# Patient Record
Sex: FEMALE | Race: OTHER
[De-identification: names, ages, dates, MRNs, and addresses within clinical notes are randomized per-mention and may not be internally consistent; named-entity substitution may affect disease eponyms.]

---

## 2019-02-27 ENCOUNTER — FORM ENCOUNTER (OUTPATIENT)
Age: 69
End: 2019-02-27

## 2019-03-07 ENCOUNTER — FORM ENCOUNTER (OUTPATIENT)
Age: 69
End: 2019-03-07

## 2019-03-20 ENCOUNTER — FORM ENCOUNTER (OUTPATIENT)
Age: 69
End: 2019-03-20

## 2021-11-11 ENCOUNTER — NON-APPOINTMENT (OUTPATIENT)
Age: 71
End: 2021-11-11

## 2021-11-18 ENCOUNTER — APPOINTMENT (OUTPATIENT)
Dept: BREAST CENTER | Facility: CLINIC | Age: 71
End: 2021-11-18

## 2021-11-18 ENCOUNTER — APPOINTMENT (OUTPATIENT)
Dept: BREAST CENTER | Facility: CLINIC | Age: 71
End: 2021-11-18
Payer: MEDICARE

## 2021-11-18 VITALS
BODY MASS INDEX: 32.72 KG/M2 | HEIGHT: 64.57 IN | WEIGHT: 194 LBS | HEART RATE: 81 BPM | OXYGEN SATURATION: 97 % | SYSTOLIC BLOOD PRESSURE: 137 MMHG | DIASTOLIC BLOOD PRESSURE: 84 MMHG

## 2021-11-18 DIAGNOSIS — R92.2 INCONCLUSIVE MAMMOGRAM: ICD-10-CM

## 2021-11-18 DIAGNOSIS — F17.200 NICOTINE DEPENDENCE, UNSPECIFIED, UNCOMPLICATED: ICD-10-CM

## 2021-11-18 DIAGNOSIS — Z78.9 OTHER SPECIFIED HEALTH STATUS: ICD-10-CM

## 2021-11-18 DIAGNOSIS — Z87.891 PERSONAL HISTORY OF NICOTINE DEPENDENCE: ICD-10-CM

## 2021-11-18 DIAGNOSIS — Z85.3 PERSONAL HISTORY OF MALIGNANT NEOPLASM OF BREAST: ICD-10-CM

## 2021-11-18 DIAGNOSIS — Z12.39 ENCOUNTER FOR OTHER SCREENING FOR MALIGNANT NEOPLASM OF BREAST: ICD-10-CM

## 2021-11-18 PROCEDURE — 99213 OFFICE O/P EST LOW 20 MIN: CPT

## 2021-11-18 NOTE — PHYSICAL EXAM
[Normocephalic] : normocephalic [Supple] : supple [No Supraclavicular Adenopathy] : no supraclavicular adenopathy [Examined in the supine and seated position] : examined in the supine and seated position [Symmetrical] : symmetrical [No dominant masses] : no dominant masses in right breast  [No dominant masses] : no dominant masses left breast [No Nipple Retraction] : no left nipple retraction [No Nipple Discharge] : no left nipple discharge [No Axillary Lymphadenopathy] : no left axillary lymphadenopathy

## 2021-11-25 NOTE — REASON FOR VISIT
[Follow-Up: _____] : a [unfilled] follow-up visit [[employer] ] : provided by [employer]   [Interpreters_IDNumber] : [id number] [Interpreters_FullName] : Chantell [TWNoteComboBox1] : [place]

## 2021-11-25 NOTE — ASSESSMENT
[FreeTextEntry1] : 71 year old female previously under the care of Dr. 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 Pt does not recall ever seeing Med Onc. Pt also has hx of core biopsy in 2019 which was benign.\par \par  services used throughout visit as noted.\par \par Denies any palpable abnormalities, any skin changes/dimpling, or any nipple discharge bilaterally. \par \par Denies family history of breast or ovarian cancer. \par \par Denies ever having genetic testing.\par \par Reviewed and discussed breast imaging results. Advised pt to f/up with BL screening mammo and breast US and f/up visit for re-examination in 1 year. Patient verbalized understanding and agrees with plan.\par \par

## 2021-11-25 NOTE — DATA REVIEWED
[FreeTextEntry1] : 2/28/2019 (R) bilateral screening breasts are heterogeneously dense, 1.2 cm partially circumscribed mass in the subareolar region of the right breast. There are stable morphologically benign-appearing calcifications in both breasts. There are postlumpectomy surgical changes and post radiation therapy changes in the left breast. A scar marker is in place. A tissue marker is in place in each breast. In the retroareolar region right breast there is a heterogeneous predominantly hypoechoic mass with an irregular margin. This mass measures 1.3 x 1.1 x 1.0 cm. This mass is suspicious. US guided core biopsy recommended. Suspicious 1.3 cm mass subareolar region right breast. BIRADS 4 \par \par 3/8/2019 ([facility]) right retroareolar breast US guided biopsy pathology: Duct dilatation with mild periductal inflammatory infiltrate and dense stromal fibrosis. Recent hemorrhage/blood clot. Benign concordant, 6 month follow up US recommended. \par \par 11/18/2021 ([facility]) bilateral dx mammogram/US showing breasts are heterogeneously dense, Distortion in the central inner posterior left breast, mild skin and trabecular thickening remain stable. Benign posttreatment changes. No suspicious calcifications, dominant mass or new distortion is present. Oval mass in the right retroareolar breast with a tissue marker clip remains unchanged measuring 1.4 cm. Benign. Posttreatment changes left breast without suspicious features. Stable. Benign. No mammographic or ultrasonographic evidence of malignancy. No significant change. Benign BIRADS 2

## 2021-11-25 NOTE — PAST MEDICAL HISTORY
[Postmenopausal] : The patient is postmenopausal [Menarche Age ____] : age at menarche was [unfilled] [Menopause Age____] : age at menopause was [unfilled] [Total Preg ___] : G[unfilled] [Living ___] : Living: [unfilled] [History of Hormone Replacement Treatment] : has no history of hormone replacement treatment [FreeTextEntry5] :  [FreeTextEntry6] : No [FreeTextEntry7] : No [FreeTextEntry8] : yes

## 2021-11-25 NOTE — HISTORY OF PRESENT ILLNESS
[FreeTextEntry1] : 71 year old female previously under the care of Dr. 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 Pt does not recall ever seeing Med Onc. Pt also has hx of core biopsy in 2019 which was benign.\par \par  services used throughout visit as noted.\par \par Denies any palpable abnormalities, any skin changes/dimpling, or any nipple discharge bilaterally. \par \par Denies family history of breast or ovarian cancer. \par \par Denies ever having genetic testing.\par \par

## 2022-12-01 ENCOUNTER — NON-APPOINTMENT (OUTPATIENT)
Age: 72
End: 2022-12-01

## 2022-12-15 ENCOUNTER — NON-APPOINTMENT (OUTPATIENT)
Age: 72
End: 2022-12-15

## 2022-12-27 ENCOUNTER — APPOINTMENT (OUTPATIENT)
Dept: BREAST CENTER | Facility: CLINIC | Age: 72
End: 2022-12-27

## 2022-12-27 VITALS
HEART RATE: 111 BPM | DIASTOLIC BLOOD PRESSURE: 90 MMHG | BODY MASS INDEX: 32.04 KG/M2 | SYSTOLIC BLOOD PRESSURE: 156 MMHG | WEIGHT: 190 LBS | HEIGHT: 64.57 IN

## 2022-12-27 DIAGNOSIS — R92.8 OTHER ABNORMAL AND INCONCLUSIVE FINDINGS ON DIAGNOSTIC IMAGING OF BREAST: ICD-10-CM

## 2022-12-27 PROCEDURE — 99213 OFFICE O/P EST LOW 20 MIN: CPT

## 2022-12-27 RX ORDER — CELECOXIB 50 MG/1
CAPSULE ORAL
Refills: 0 | Status: ACTIVE | COMMUNITY

## 2022-12-27 RX ORDER — CHLORHEXIDINE GLUCONATE, 0.12% ORAL RINSE 1.2 MG/ML
0.12 SOLUTION DENTAL
Qty: 473 | Refills: 0 | Status: ACTIVE | COMMUNITY
Start: 2022-10-03

## 2022-12-27 RX ORDER — SILVER SULFADIAZINE 10 G/1000G
1 CREAM TOPICAL
Qty: 400 | Refills: 0 | Status: ACTIVE | COMMUNITY
Start: 2022-12-15

## 2022-12-27 RX ORDER — PRAVASTATIN SODIUM 10 MG/1
10 TABLET ORAL
Qty: 30 | Refills: 0 | Status: ACTIVE | COMMUNITY
Start: 2022-11-07

## 2022-12-27 RX ORDER — IBUPROFEN 600 MG/1
600 TABLET, FILM COATED ORAL
Qty: 20 | Refills: 0 | Status: ACTIVE | COMMUNITY
Start: 2022-09-27

## 2022-12-27 NOTE — DATA REVIEWED
[FreeTextEntry1] : 2/28/2019 ([facility]) bilateral screening breasts are heterogeneously dense, 1.2 cm partially circumscribed mass in the subareolar region of the right breast. There are stable morphologically benign-appearing calcifications in both breasts. There are postlumpectomy surgical changes and post radiation therapy changes in the left breast. A scar marker is in place. A tissue marker is in place in each breast. In the retroareolar region right breast there is a heterogeneous predominantly hypoechoic mass with an irregular margin. This mass measures 1.3 x 1.1 x 1.0 cm. This mass is suspicious. US guided core biopsy recommended. Suspicious 1.3 cm mass subareolar region right breast. BIRADS 4 \par \par 3/8/2019 ([facility]) right retroareolar breast US guided biopsy pathology: Duct dilatation with mild periductal inflammatory infiltrate and dense stromal fibrosis. Recent hemorrhage/blood clot. Benign concordant, 6 month follow up US recommended. \par \par 11/18/2021 ([facility]) bilateral dx mammogram/US showing breasts are heterogeneously dense, Distortion in the central inner posterior left breast, mild skin and trabecular thickening remain stable. Benign posttreatment changes. No suspicious calcifications, dominant mass or new distortion is present. Oval mass in the right retroareolar breast with a tissue marker clip remains unchanged measuring 1.4 cm. Benign. Posttreatment changes left breast without suspicious features. Stable. Benign. No mammographic or ultrasonographic evidence of malignancy. No significant change. Benign BIRADS 2 \par \par 11/22/22 ([facility]) B/L sMMG/US: heterogeneously dense. Slight change in morphology of the 1.5 x 1.2 x 1.1 cm right retroareolar solid/cystic structure with a 0.8 cm solid component, though likely overall unchanged in size and previously biopsied in 2019 and found to be benign. The solid component appears singular instead of 2 separate smaller solid complements as compared to 2021. FOLLOW-UP: 6 month follow-up targeted right breast ultrasound to ensure stability. BI-RADS 3: Probably benign.  Advancement Flap (Double) Text: The defect edges were debeveled with a #15 scalpel blade.  Given the location of the defect and the proximity to free margins a double advancement flap was deemed most appropriate.  Using a sterile surgical marker, the appropriate advancement flaps were drawn incorporating the defect and placing the expected incisions within the relaxed skin tension lines where possible.    The area thus outlined was incised deep to adipose tissue with a #15 scalpel blade.  The skin margins were undermined to an appropriate distance in all directions utilizing iris scissors.

## 2022-12-27 NOTE — PAST MEDICAL HISTORY
[History of Hormone Replacement Treatment] : has no history of hormone replacement treatment [FreeTextEntry5] :  [FreeTextEntry6] : No [FreeTextEntry7] : No [FreeTextEntry8] : yes

## 2022-12-27 NOTE — HISTORY OF PRESENT ILLNESS
[FreeTextEntry1] : 72 year old female presents for breast cancer surveillance with a history of LEFT IDC 1.5cm ER [place] HER-2 s/p left lumpectomy in 2010, left SLNB (1/3 lymph nodes positive for disease) followed by XRT, and Anastrazole x 5-6 years. Pt does not recall ever seeing Med Onc. Pt also has hx of core biopsy in 2019 which was benign.\par \par Annual mammo/sono in Nov 2022 showed slight change in 1.1 cm right retroareolar solid/cystic structure recommending 6 month follow up US.\par \par Previously under the care of Dr. Aj Hampton\par \par  services used throughout visit as noted.\par \par Denies any palpable abnormalities, any skin changes/dimpling, or any nipple discharge bilaterally. Denies family history of breast or ovarian cancer. Denies ever having genetic testing.\par \par

## 2022-12-27 NOTE — ASSESSMENT
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 Reviewed and discussed breast imaging results. Annual mammo/sono in Nov 2022 showed slight change in 1.1 cm right retroareolar solid/cystic structure recommending 6 month follow up US. Patient will return in May 2023 after for reexamination. Patient verbalized understanding and agreement of plan.\par \par \par

## 2023-05-15 PROBLEM — Z85.3 HISTORY OF BREAST CANCER: Status: ACTIVE | Noted: 2021-11-18

## 2023-05-16 ENCOUNTER — APPOINTMENT (OUTPATIENT)
Dept: BREAST CENTER | Facility: CLINIC | Age: 73
End: 2023-05-16
Payer: MEDICARE

## 2023-05-16 VITALS
HEART RATE: 88 BPM | SYSTOLIC BLOOD PRESSURE: 160 MMHG | BODY MASS INDEX: 32.21 KG/M2 | WEIGHT: 191 LBS | DIASTOLIC BLOOD PRESSURE: 82 MMHG | HEIGHT: 64.57 IN

## 2023-05-16 DIAGNOSIS — Z85.3 PERSONAL HISTORY OF MALIGNANT NEOPLASM OF BREAST: ICD-10-CM

## 2023-05-16 DIAGNOSIS — Z00.00 ENCOUNTER FOR GENERAL ADULT MEDICAL EXAMINATION W/OUT ABNORMAL FINDINGS: ICD-10-CM

## 2023-05-16 PROCEDURE — 99213 OFFICE O/P EST LOW 20 MIN: CPT

## 2023-05-18 NOTE — REASON FOR VISIT
[Follow-Up: _____] : a [unfilled] follow-up visit [[employer] ] : provided by [employer]   [FreeTextEntry1] : Surveillance [Interpreters_IDNumber] : [id number] [Interpreters_FullName] : Chantell [TWNoteComboBox1] : [place]

## 2023-05-18 NOTE — DATA REVIEWED
[FreeTextEntry1] : 2/28/2019 (R) bilateral screening breasts are heterogeneously dense, 1.2 cm partially circumscribed mass in the subareolar region of the right breast. There are stable morphologically benign-appearing calcifications in both breasts. There are postlumpectomy surgical changes and post radiation therapy changes in the left breast. A scar marker is in place. A tissue marker is in place in each breast. In the retroareolar region right breast there is a heterogeneous predominantly hypoechoic mass with an irregular margin. This mass measures 1.3 x 1.1 x 1.0 cm. This mass is suspicious. US guided core biopsy recommended. Suspicious 1.3 cm mass subareolar region right breast. BIRADS 4 \par \par 3/8/2019 (R) right retroareolar breast US guided biopsy pathology: Duct dilatation with mild periductal inflammatory infiltrate and dense stromal fibrosis. Recent hemorrhage/blood clot. Benign concordant, 6 month follow up US recommended. \par \par 11/18/2021 (R) bilateral dx mammogram/US showing breasts are heterogeneously dense, Distortion in the central inner posterior left breast, mild skin and trabecular thickening remain stable. Benign posttreatment changes. No suspicious calcifications, dominant mass or new distortion is present. Oval mass in the right retroareolar breast with a tissue marker clip remains unchanged measuring 1.4 cm. Benign. Posttreatment changes left breast without suspicious features. Stable. Benign. No mammographic or ultrasonographic evidence of malignancy. No significant change. Benign BIRADS 2 \par \par 11/22/22 (R) B/L sMMG/US: heterogeneously dense. Slight change in morphology of the 1.5 x 1.2 x 1.1 cm right retroareolar solid/cystic structure with a 0.8 cm solid component, though likely overall unchanged in size and previously biopsied in 2019 and found to be benign. The solid component appears singular instead of 2 separate smaller solid complements as compared to 2021. FOLLOW-UP: 6 month follow-up targeted right breast ultrasound to ensure stability. BI-RADS 3: Probably benign. \par \par 5/16/23 R Targeted US ([facility]): Stable 1.5 cm complex cyst in the right retroareolar region. This was previously biopsied and proven benign. Continued follow-up recommended. The patient is due for bilateral mammography and sonography in November 2022. BIRADS 3.

## 2023-05-18 NOTE — HISTORY OF PRESENT ILLNESS
[FreeTextEntry1] : 72 year old female presents for breast cancer surveillance with a history of LEFT IDC 1.5cm ER [place] HER-2 s/p left lumpectomy in 2010, left SLNB (1/3 lymph nodes positive for disease) followed by XRT, and Anastrazole x 5-6 years. Pt does not recall ever seeing Med Onc. Pt also has hx of core biopsy in 2019 which was benign.\par \par Annual mammo/sono in Nov 2022 showed slight change in 1.1 cm right retroareolar solid/cystic structure recommending 6 month follow up US. Targeted R US performed today BIRADS 3 recommending continued follow up during annual imaging \par \par Previously under the care of Dr. Aj Hampton\par \par Denies any palpable abnormalities, any skin changes/dimpling, or any nipple discharge bilaterally. Denies family history of breast or ovarian cancer. Denies ever having genetic testing.\par \par

## 2023-05-18 NOTE — PAST MEDICAL HISTORY
[Postmenopausal] : The patient is postmenopausal [Menarche Age ____] : age at menarche was [unfilled] [Menopause Age____] : age at menopause was [unfilled] [Total Preg ___] : G[unfilled] [Living ___] : Living: [unfilled] [History of Hormone Replacement Treatment] : has no history of hormone replacement treatment [FreeTextEntry6] : No [FreeTextEntry5] :  [FreeTextEntry7] : No [FreeTextEntry8] : yes

## 2023-05-18 NOTE — ASSESSMENT
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 Reviewed and discussed breast imaging results. Annual mammo/sono in Nov 2022 showed slight change in 1.1 cm right retroareolar solid/cystic structure recommending 6 month follow up US, performed today BIRADS 3. Patient will have annual imaging and reexamination in Nov 2023. Patient verbalized understanding and agreement of plan.\par \par \par

## 2023-10-09 ENCOUNTER — NON-APPOINTMENT (OUTPATIENT)
Age: 73
End: 2023-10-09

## 2023-10-20 ENCOUNTER — NON-APPOINTMENT (OUTPATIENT)
Age: 73
End: 2023-10-20

## 2023-11-21 ENCOUNTER — APPOINTMENT (OUTPATIENT)
Dept: BREAST CENTER | Facility: CLINIC | Age: 73
End: 2023-11-21

## 2024-11-18 ENCOUNTER — NON-APPOINTMENT (OUTPATIENT)
Age: 74
End: 2024-11-18

## 2024-11-19 ENCOUNTER — APPOINTMENT (OUTPATIENT)
Dept: BREAST CENTER | Facility: CLINIC | Age: 74
End: 2024-11-19